# Patient Record
Sex: MALE | Race: WHITE | NOT HISPANIC OR LATINO | URBAN - METROPOLITAN AREA
[De-identification: names, ages, dates, MRNs, and addresses within clinical notes are randomized per-mention and may not be internally consistent; named-entity substitution may affect disease eponyms.]

---

## 2017-10-09 ENCOUNTER — OFFICE VISIT - HEALTHEAST (OUTPATIENT)
Dept: FAMILY MEDICINE | Facility: CLINIC | Age: 42
End: 2017-10-09

## 2017-10-09 DIAGNOSIS — H81.10 VERTIGO, BENIGN PAROXYSMAL, UNSPECIFIED LATERALITY: ICD-10-CM

## 2017-10-09 RX ORDER — MECLIZINE HYDROCHLORIDE 25 MG/1
25 TABLET ORAL 2 TIMES DAILY PRN
Qty: 15 TABLET | Refills: 1 | Status: SHIPPED | OUTPATIENT
Start: 2017-10-09

## 2021-03-17 ENCOUNTER — AMBULATORY - HEALTHEAST (OUTPATIENT)
Dept: NURSING | Facility: CLINIC | Age: 46
End: 2021-03-17

## 2021-04-07 ENCOUNTER — AMBULATORY - HEALTHEAST (OUTPATIENT)
Dept: NURSING | Facility: CLINIC | Age: 46
End: 2021-04-07

## 2021-06-13 NOTE — PROGRESS NOTES
Assessment:   1. Vertigo, benign paroxysmal, unspecified laterality  - meclizine (ANTIVERT) 25 mg tablet; Take 1 tablet (25 mg total) by mouth 2 (two) times a day as needed for dizziness.  Dispense: 15 tablet; Refill: 1  - Ambulatory referral to PT/OT.   Benign positional vertigo      Plan:   At this point I think that he does have vertigo.  The discussed the management for him at this point.  He does not have any other major symptoms so we will treated as benign positional paroxysmal vertigo with meclizine.  He will let us know by the next 4 days if there is any changes or improvement otherwise he will start his physical therapy if no improvement.  Meclizine was prescribed.    Subjective:       Jb Flannery is a 42 y.o. male who presents for evaluation of dizziness. The symptoms started 1 week ago and are improved mildly but still has mild symptoms when attempting to look around. The attacks occur daily mainly in the morning. and last a few minutes. Positions that worsen symptoms: Lying down and turning while in bed.  Unsure which side makes it worse. Previous workup/treatments: none. Associated ear symptoms: none. Associated CNS symptoms: none. Recent infections: none. Head trauma: denied. Drug ingestion: none. Noise exposure: no occupational exposure and no firearm exposure. Family history: non-contributory.      No past medical history on file.  No past surgical history on file.  Social History     Social History     Marital status:      Spouse name: N/A     Number of children: N/A     Years of education: N/A     Social History Main Topics     Smoking status: Never Smoker     Smokeless tobacco: Never Used     Alcohol use None     Drug use: None     Sexual activity: Not Asked     Other Topics Concern     None     Social History Narrative     None     No family history on file.  No Known Allergies  Current Outpatient Prescriptions   Medication Sig Dispense Refill     meclizine (ANTIVERT) 25 mg tablet Take  1 tablet (25 mg total) by mouth 2 (two) times a day as needed for dizziness. 15 tablet 1     No current facility-administered medications for this visit.            Review of Systems  Constitutional: negative for fatigue, fevers and weight loss  Eyes: negative for visual disturbance  Ears, nose, mouth, throat, and face: negative for ear drainage, earaches, hearing loss, nasal congestion and tinnitus  Respiratory: negative for cough, dyspnea on exertion and pleurisy/chest pain  Cardiovascular: negative for chest pain, dyspnea, fatigue, palpitations and paroxysmal nocturnal dyspnea  Gastrointestinal: negative for abdominal pain, change in bowel habits, diarrhea, jaundice, nausea and vomiting  Neurological: negative for gait problems, headaches, speech problems and vertigo  Behavioral/Psych: negative for anxiety, behavior problems, depression, excessive alcohol consumption, loss of interest in favorite activities and sleep disturbance      Objective:      /80  Pulse 64  General appearance: alert, appears stated age, cooperative and no distress  Head: Normocephalic, without obvious abnormality, atraumatic  Eyes: conjunctivae/corneas clear. PERRL, EOM's intact. Fundi benign.  Ears: normal TM's and external ear canals both ears  Throat: lips, mucosa, and tongue normal; teeth and gums normal  Neck: no adenopathy, supple, symmetrical, trachea midline and thyroid not enlarged, symmetric, no tenderness/mass/nodules  Lungs: clear to auscultation bilaterally  Heart: regular rate and rhythm, S1, S2 normal, no murmur, click, rub or gallop  Abdomen: soft, non-tender; bowel sounds normal; no masses,  no organomegaly  Neurologic: Alert and oriented X 3, normal strength and tone. Normal symmetric reflexes. Normal coordination and gait  .Cranial nerves II through XII was normal.

## 2021-06-26 ENCOUNTER — HEALTH MAINTENANCE LETTER (OUTPATIENT)
Age: 46
End: 2021-06-26

## 2021-10-16 ENCOUNTER — HEALTH MAINTENANCE LETTER (OUTPATIENT)
Age: 46
End: 2021-10-16

## 2022-07-23 ENCOUNTER — HEALTH MAINTENANCE LETTER (OUTPATIENT)
Age: 47
End: 2022-07-23

## 2022-10-01 ENCOUNTER — HEALTH MAINTENANCE LETTER (OUTPATIENT)
Age: 47
End: 2022-10-01

## 2023-08-06 ENCOUNTER — HEALTH MAINTENANCE LETTER (OUTPATIENT)
Age: 48
End: 2023-08-06

## 2024-09-28 ENCOUNTER — HEALTH MAINTENANCE LETTER (OUTPATIENT)
Age: 49
End: 2024-09-28